# Patient Record
Sex: FEMALE | Race: WHITE | NOT HISPANIC OR LATINO | URBAN - METROPOLITAN AREA
[De-identification: names, ages, dates, MRNs, and addresses within clinical notes are randomized per-mention and may not be internally consistent; named-entity substitution may affect disease eponyms.]

---

## 2018-03-31 ENCOUNTER — EMERGENCY (EMERGENCY)
Facility: HOSPITAL | Age: 73
LOS: 1 days | Discharge: HOME | End: 2018-03-31
Attending: EMERGENCY MEDICINE | Admitting: EMERGENCY MEDICINE
Payer: COMMERCIAL

## 2018-03-31 VITALS
DIASTOLIC BLOOD PRESSURE: 74 MMHG | RESPIRATION RATE: 18 BRPM | HEART RATE: 79 BPM | TEMPERATURE: 98 F | OXYGEN SATURATION: 95 % | WEIGHT: 141.98 LBS | HEIGHT: 58 IN | SYSTOLIC BLOOD PRESSURE: 152 MMHG

## 2018-03-31 DIAGNOSIS — L03.116 CELLULITIS OF LEFT LOWER LIMB: ICD-10-CM

## 2018-03-31 DIAGNOSIS — M79.662 PAIN IN LEFT LOWER LEG: ICD-10-CM

## 2018-03-31 LAB
ANION GAP SERPL CALC-SCNC: 12 MMOL/L — SIGNIFICANT CHANGE UP (ref 5–17)
APTT BLD: 29.8 SEC — SIGNIFICANT CHANGE UP (ref 27.5–37.4)
BASOPHILS NFR BLD AUTO: 0.7 % — SIGNIFICANT CHANGE UP (ref 0–2)
BUN SERPL-MCNC: 19 MG/DL — SIGNIFICANT CHANGE UP (ref 7–23)
CALCIUM SERPL-MCNC: 9.2 MG/DL — SIGNIFICANT CHANGE UP (ref 8.4–10.5)
CHLORIDE SERPL-SCNC: 102 MMOL/L — SIGNIFICANT CHANGE UP (ref 96–108)
CO2 SERPL-SCNC: 28 MMOL/L — SIGNIFICANT CHANGE UP (ref 22–31)
CREAT SERPL-MCNC: 0.85 MG/DL — SIGNIFICANT CHANGE UP (ref 0.5–1.3)
EOSINOPHIL NFR BLD AUTO: 4 % — SIGNIFICANT CHANGE UP (ref 0–6)
GLUCOSE SERPL-MCNC: 111 MG/DL — HIGH (ref 70–99)
HCT VFR BLD CALC: 40.1 % — SIGNIFICANT CHANGE UP (ref 34.5–45)
HGB BLD-MCNC: 13.3 G/DL — SIGNIFICANT CHANGE UP (ref 11.5–15.5)
INR BLD: 1.08 — SIGNIFICANT CHANGE UP (ref 0.88–1.16)
LYMPHOCYTES # BLD AUTO: 21.4 % — SIGNIFICANT CHANGE UP (ref 13–44)
MCHC RBC-ENTMCNC: 27.4 PG — SIGNIFICANT CHANGE UP (ref 27–34)
MCHC RBC-ENTMCNC: 33.2 G/DL — SIGNIFICANT CHANGE UP (ref 32–36)
MCV RBC AUTO: 82.7 FL — SIGNIFICANT CHANGE UP (ref 80–100)
MONOCYTES NFR BLD AUTO: 14.2 % — HIGH (ref 2–14)
NEUTROPHILS NFR BLD AUTO: 59.7 % — SIGNIFICANT CHANGE UP (ref 43–77)
PLATELET # BLD AUTO: 356 K/UL — SIGNIFICANT CHANGE UP (ref 150–400)
POTASSIUM SERPL-MCNC: 4.3 MMOL/L — SIGNIFICANT CHANGE UP (ref 3.5–5.3)
POTASSIUM SERPL-SCNC: 4.3 MMOL/L — SIGNIFICANT CHANGE UP (ref 3.5–5.3)
PROTHROM AB SERPL-ACNC: 12 SEC — SIGNIFICANT CHANGE UP (ref 9.8–12.7)
RBC # BLD: 4.85 M/UL — SIGNIFICANT CHANGE UP (ref 3.8–5.2)
RBC # FLD: 14.1 % — SIGNIFICANT CHANGE UP (ref 10.3–16.9)
SODIUM SERPL-SCNC: 142 MMOL/L — SIGNIFICANT CHANGE UP (ref 135–145)
WBC # BLD: 4.3 K/UL — SIGNIFICANT CHANGE UP (ref 3.8–10.5)
WBC # FLD AUTO: 4.3 K/UL — SIGNIFICANT CHANGE UP (ref 3.8–10.5)

## 2018-03-31 PROCEDURE — 85730 THROMBOPLASTIN TIME PARTIAL: CPT

## 2018-03-31 PROCEDURE — 93970 EXTREMITY STUDY: CPT

## 2018-03-31 PROCEDURE — 36415 COLL VENOUS BLD VENIPUNCTURE: CPT

## 2018-03-31 PROCEDURE — 93970 EXTREMITY STUDY: CPT | Mod: 26

## 2018-03-31 PROCEDURE — 80048 BASIC METABOLIC PNL TOTAL CA: CPT

## 2018-03-31 PROCEDURE — 85610 PROTHROMBIN TIME: CPT

## 2018-03-31 PROCEDURE — 99284 EMERGENCY DEPT VISIT MOD MDM: CPT | Mod: 25

## 2018-03-31 PROCEDURE — 99284 EMERGENCY DEPT VISIT MOD MDM: CPT

## 2018-03-31 PROCEDURE — 85025 COMPLETE CBC W/AUTO DIFF WBC: CPT

## 2018-03-31 RX ORDER — AZTREONAM 2 G
1 VIAL (EA) INJECTION
Qty: 14 | Refills: 0 | OUTPATIENT
Start: 2018-03-31 | End: 2018-04-06

## 2018-03-31 RX ADMIN — Medication 1 TABLET(S): at 19:55

## 2018-03-31 NOTE — ED ADULT TRIAGE NOTE - CHIEF COMPLAINT QUOTE
L leg pain and swelling x2 days, no trauma, on ASA 81mg every day, recent flight from [place] on Sunday L leg pain and swelling x2 days, no trauma, on ASA 81mg every day, recent flight from [place] on Sunday, no CP/SOB, no cardiac Hx

## 2018-03-31 NOTE — ED PROVIDER NOTE - MUSCULOSKELETAL, MLM
Spine appears normal, range of motion is not limited, left calf with mild redness/warmth/ tenderness lateral / anterior.  1+ edema.  right ankle mild swelling.  pedal pulse 2+

## 2018-03-31 NOTE — ED PROVIDER NOTE - OBJECTIVE STATEMENT
here with 2 d of left calf pain/ mild swelling/ redness.  Denies trauma, fever, weakness, chest pain or trouble breathing.  Also notes mild swelling around right ankle.  Took motrin which helped with pain.

## 2018-03-31 NOTE — ED ADULT NURSE NOTE - CHIEF COMPLAINT QUOTE
L leg pain and swelling x2 days, no trauma, on ASA 81mg every day, recent flight from [place] on Sunday, no CP/SOB, no cardiac Hx

## 2018-03-31 NOTE — ED PROVIDER NOTE - MEDICAL DECISION MAKING DETAILS
calf pain/ swelling/ redness.  duplex neg for dvt.  suspect cellulitis.  plan bactrim.  discussed repeat duplex in 1 week if symptoms persist

## 2018-03-31 NOTE — ED ADULT NURSE NOTE - OBJECTIVE STATEMENT
pt received in [facility] a & o x 3 pt c/o left calf swelling and pain since last night , extremity is warm to touch , pt had a recent flight from [place] on Sunday , pt was seen this morning at [facility] and was informed to visit ER to rule out a DVT will continue to monitor

## 2022-12-08 ENCOUNTER — APPOINTMENT (OUTPATIENT)
Dept: PULMONOLOGY | Facility: CLINIC | Age: 77
End: 2022-12-08

## 2022-12-08 ENCOUNTER — LABORATORY RESULT (OUTPATIENT)
Age: 77
End: 2022-12-08

## 2022-12-08 VITALS
HEIGHT: 57.87 IN | BODY MASS INDEX: 29.81 KG/M2 | WEIGHT: 142 LBS | DIASTOLIC BLOOD PRESSURE: 82 MMHG | HEART RATE: 79 BPM | SYSTOLIC BLOOD PRESSURE: 139 MMHG | TEMPERATURE: 97.8 F | OXYGEN SATURATION: 95 %

## 2022-12-08 DIAGNOSIS — L95.9 VASCULITIS LIMITED TO THE SKIN, UNSPECIFIED: ICD-10-CM

## 2022-12-08 DIAGNOSIS — Z86.018 PERSONAL HISTORY OF OTHER BENIGN NEOPLASM: ICD-10-CM

## 2022-12-08 DIAGNOSIS — Z86.79 PERSONAL HISTORY OF OTHER DISEASES OF THE CIRCULATORY SYSTEM: ICD-10-CM

## 2022-12-08 PROCEDURE — 99244 OFF/OP CNSLTJ NEW/EST MOD 40: CPT

## 2022-12-08 RX ORDER — CANDESARTAN CILEXETIL 8 MG/1
8 TABLET ORAL
Refills: 0 | Status: ACTIVE | COMMUNITY

## 2022-12-08 RX ORDER — CHROMIUM 200 MCG
TABLET ORAL
Refills: 0 | Status: ACTIVE | COMMUNITY

## 2022-12-08 RX ORDER — APIXABAN 2.5 MG/1
2.5 TABLET, FILM COATED ORAL
Refills: 0 | Status: ACTIVE | COMMUNITY

## 2022-12-08 RX ORDER — ROSUVASTATIN CALCIUM 10 MG/1
10 TABLET, FILM COATED ORAL
Refills: 0 | Status: ACTIVE | COMMUNITY

## 2022-12-08 NOTE — ASSESSMENT
[FreeTextEntry1] : 12-8-22:\par \par It was a pleasure to meet Yancy today in consultation. Her respiratory issues are summarized:\par \par 1. Lymphangioleiomyomatosis (MALONE)\par This patient has a diagnosis of MALONE made at [facility] in 1996. She remains functional and has not noticed any functional limitation. There is no history of pneumothoraces or pleural effusions. 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 She has been taking flights and has not had any problem in flight. As per the history provided, she has not been put on sirolimus. \par \par Most of the people who show progression with MALONE show deterioration in their PFTs and complications at a young stage. Yancy is in her 70s: it is likely that her prognosis will remain good and she will not have deterioration in lung function. \par \par Our plan of action is preventative.\par a. We have discussed the importance of COVID and influenza vaccines. However she does develop a very severe reaction and she showed us images of the skin rash that she has developed with influenza vaccine necessitating oral steroids. \par b. She is a lifelong non smoker\par c. Ensuring that she does not require oxygen either at night or during walking. If she does desaturate on 6MWT (scheduled), we will also get a nocturnal oxygen test\par d. Obstructive sleep apnea. She does have a crowded oropharynx although she denies symptoms of obstructive sleep apnea\par e. Continuous exercise. After getting PFTs, we will advise her to exercise on a daily basis\par f. dietary restrictions. In patients who have MALONE, it has been recommended that they avoid specific foods such as those containing soy.\par g. Obstructive lung disease. We will get PFTs to see if the patient has obstructive lung disease. If she does, we will give her inhaled corticosteroids +/- LABA\par h. The patient is aware of the risks of taking air travel. However, she has the benefit of time and has not developed pneumothorax since the diagnosis of MALONE was made.\par i. We will also check what her lung reserve is and increase in of cysts in her lungs. We have ordered a low dose chest CT\par j. The patient is aware that taking estrogens can worsen the disease and she is not on any hormone replacement therapy.\par \par Plan:\par - As mentioned above we will assess PFT, 6MWT and a low dose chest CT to see if there has been progression of disease since 2019. If that is deemed to be the case, we will consider very low doses of Sirolimus since she is post-menopausal aiming for a trough level at 20-28 hrs of post sirolimus injection < 10ng/ml. Most of the time in post-menopausal women, if sirolimus is required, the dose can be significantly reduced to 1mg/day.\par - If Sirolimus is not given, it would be imperative to follow up her exercise capacity and her breathing tests. An FEV1 of < 70% of predicted, reduction of FEV1 > 100 ml per year, increase in residual volume by greater than 120%, drop in SpO2 > 4% on brisk walking, or a PaO2 < 70mmHg would be reasons to consider given sirolimus. \par - We will order an echocardiogram to evaluate pulmonary artery pressures\par - After the testing is done, we will consider vascular endothelial growth factor (VEG-D) monitoring. We would obtain one level and establish a baseline for the patient. \par - We will be monitoring for angiolipomas in the kidneys or other organs\par - We will present her case to [facility] to [facility]\par \par 2. Hypercoagulable state\par She has a history of superficial thrombosis of lesser saphenous vein. She has a prothrombin G mutation. She is on Eliquis 2.5 BID. Her d-dimer has been persistently high. We will re-check this today.\par \par 3. History of COVID-19 infection\par She tested positive for COVID-19 in October. She was sick for 3 weeks. She had a cough and noticed a drop in her oxygen saturation. She is back to her baseline now and does not complain of shortness of breath.\par \par 4. 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 She has a rash and history of lipomas. There is ecchymosis seen on her inner thigh. We will check vasculitis and collagen vascular screening (RF, WILLIAN, CH50) labs today.\par \par 5. Rhinitis\par She complains of yellow phlegm and change in quality of her voice. She has a deviated nasal septum, erythema and narrowing on the right side, no significant discharge. We recommend Igor med nasal sinus rinse followed by Flonase twice a day. We do not recommend she take antibiotics.\par \par 6. 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 She denies symptoms of RICCO. She does not believe that she snores. We will hold off doing a sleep study at this time.\par \par Return to clinic: 2 months (telehealth)

## 2022-12-08 NOTE — CONSULT LETTER
[Dear  ___] : Dear  [unfilled], [Consult Letter:] : I had the pleasure of evaluating your patient, [unfilled]. [FreeTextEntry2] : Dr. Palomo Lewis \par [address] [facility], [address]\[place] [address] \par ph [phone]\par fax [phone]\par

## 2022-12-08 NOTE — PHYSICAL EXAM
[No Acute Distress] : no acute distress [Normal Appearance] : normal appearance [No Neck Mass] : no neck mass [Normal Rate/Rhythm] : normal rate/rhythm [Normal S1, S2] : normal s1, s2 [No Murmurs] : no murmurs [No Resp Distress] : no resp distress [No Abnormalities] : no abnormalities [Benign] : benign [Normal Gait] : normal gait [No Clubbing] : no clubbing [No Cyanosis] : no cyanosis [No Edema] : no edema [FROM] : FROM [Normal Color/ Pigmentation] : normal color/ pigmentation [No Focal Deficits] : no focal deficits [Oriented x3] : oriented x3 [Normal Affect] : normal affect [TextBox_11] : deviated nasal septum, erythema on the right side, narrowing on the right side, no significant discharge, mallampati IV, crowded oropharynx [TextBox_68] : very fine early inspiratory dry crackles appreciated at the bases, 1/4 way up slightly more up the left base as compared to the right

## 2022-12-08 NOTE — PROCEDURE
[FreeTextEntry1] : 9/6/2022 D-dimer 3.95\par Negative ds-DNA ab, anticardiolipin IgM, IgG, RNP ab, smith ab, SS-A, Scl-70, Jessica-1\par Beta 2 glycoprotein IgM (9.6)\par Normal beta 2 glycoprotein IgG\par Positive c-ANCA\par \par Labs 9/2021\par WILLIAN positive\par Beta-2 Glycoprotein IgA ab\par Elevated RNP ab (1.5)\par D-dimer 5.67\par \par Lower extremity doppler 6/2022\par Impression: Within normal limits bilateral deep and right superficial venous doppler exam findings. At left v. saphena parva from crusis middle plan until distal observed, segment wall has regular thickening has been interpreted as post-thrombotic sequela findings. When compared to exam in 2020, no meaningful changes have been observed.\par \par CT 1/10/2019\par Impression: Disseminated air cysts in pulmonary parenchyma with thin walls, virtually all with subcentimeter size; possible Lymphangioleiomyomatosis; no evidence of secondary supportive finding. Histiosis-x and paraseptal emphysema are considered in differential diagnosis\par Peripheral atelectasis in right middle lobe and pleuroparenchymal fibrosis\par mediastinal calcific lymph nodes, a 10 mm nodule in the right breast\par Cardiovascular calcific atherosclerotic vessel disease\par \par Chest CT Feb 2016\par Impression: Innumerable thin walled cysts diffusely throughout both lung fields with a slight apical predominance on a background of ground glass attenuation. no evidence of nodule or dominant mass. These findings are most consistent with a clinical history of MALONE

## 2022-12-08 NOTE — HISTORY OF PRESENT ILLNESS
[TextBox_4] : 77 year old female history of MALONE, superficial venous thrombosis (on eliquis), Prothrombin G therapy, skin vasculitis (diagnosed by biopsy)\par Born in [facility]\par [age] she was given hormone replacement therapy pre-menopausal\par In 1996, she developed a cough\par She was following in [place] because her son was at [place]\par In 1996 had surgery, flexible bronchoscopy and biopsy on both sides consistent with MALONE\par Hormone therapy was stopped\par In 2016, she had a chest CT at [facility], Dr. Marques\par She was stable until 2012, monitored by chest x-rays and bloodwork, PFTs\par She had COVID in October that lasted for 3 weeks. She did not take Paxlovid. She had a drop in her oxygen and cough. She had yellow phlegm\par She has not had a pneumothorax\par She has had an abnormal d-dimer elevation in the past\par In September 2019, she had a thrombosis at the lesser saphenous vein beginning from the middle of the calf accompanied by lymphdenma in the distal cruris, urgent hospitalized treated with blood thinners was continued with Xarelto. She had hematomas and bleeding. Xarelto was stopped and she was switched to Eliquis 2.5mg BID, which she continues on\par She got the flu vaccine, but due to reaction she had to take a medrol arnoldo\par She denies shortness of breath\par \par Rheumatologist: Dr. Palomo Bustos. He recommends Plaquenil but she is reluctant to try

## 2022-12-08 NOTE — ADDENDUM
[FreeTextEntry1] : I, Dr. Portillo Velasquez, personally performed the evaluation and management services for this new patient.  This evaluation and management includes conducting the initial interview of the patient, performing the initial examination, assessing all medical conditions, reviewing all medical records available and establishing the plan of comprehensive care.  Today, my ACP, Ms. Esme Lozoya, participated in the process of patient evaluation and management.  She and I have discussed the management of the patient, and she understands the plan moving forwards

## 2022-12-08 NOTE — DISCUSSION/SUMMARY
[FreeTextEntry1] : ATTENDING'S SUMMARY:\par 12-8-22:\par no pallor, no icterus\par deviated nasal septum, erythema on the right side, narrowing on the right side, no significant discharge\par no JVD, no hepatojugular reflux, no HSM\par no cervical adenopathy, no supraclavicular adenopathy\par normal s1/s2, no murmurs, rubs or gallops\par very fine early inspiratory dry crackles appreciated at the bases, 1/4 way up slightly more up the left base as compared to the right\par no cyanosis, no clubbing, no articular manifestations, no thickening of the skin, raynauds, no ulceration, no rash noted on the upper extremities\par no pedal edema

## 2022-12-09 ENCOUNTER — NON-APPOINTMENT (OUTPATIENT)
Age: 77
End: 2022-12-09

## 2022-12-09 LAB
ALBUMIN SERPL ELPH-MCNC: 4.4 G/DL
ALP BLD-CCNC: 77 U/L
ALT SERPL-CCNC: 19 U/L
ANION GAP SERPL CALC-SCNC: 12 MMOL/L
AST SERPL-CCNC: 26 U/L
BASOPHILS # BLD AUTO: 0.04 K/UL
BASOPHILS NFR BLD AUTO: 0.8 %
BILIRUB SERPL-MCNC: 0.3 MG/DL
BUN SERPL-MCNC: 17 MG/DL
C3 SERPL-MCNC: 135 MG/DL
C4 SERPL-MCNC: 33 MG/DL
CALCIUM SERPL-MCNC: 9.3 MG/DL
CH50 SERPL-MCNC: 90 U/ML
CHLORIDE SERPL-SCNC: 106 MMOL/L
CO2 SERPL-SCNC: 23 MMOL/L
COVID-19 NUCLEOCAPSID  GAM ANTIBODY INTERPRETATION: POSITIVE
COVID-19 SPIKE DOMAIN ANTIBODY INTERPRETATION: POSITIVE
CREAT SERPL-MCNC: 0.74 MG/DL
CRP SERPL-MCNC: 10 MG/L
DEPRECATED D DIMER PPP IA-ACNC: 4440 NG/ML DDU
EGFR: 84 ML/MIN/1.73M2
ENA RNP AB SER IA-ACNC: 1.2 AL
ENA SM AB SER IA-ACNC: <0.2 AL
EOSINOPHIL # BLD AUTO: 0.12 K/UL
EOSINOPHIL NFR BLD AUTO: 2.5 %
ERYTHROCYTE [SEDIMENTATION RATE] IN BLOOD BY WESTERGREN METHOD: 7 MM/HR
GLUCOSE SERPL-MCNC: 99 MG/DL
HCT VFR BLD CALC: 40.9 %
HGB BLD-MCNC: 12.7 G/DL
IMM GRANULOCYTES NFR BLD AUTO: 0.2 %
LYMPHOCYTES # BLD AUTO: 1.31 K/UL
LYMPHOCYTES NFR BLD AUTO: 27.6 %
MAN DIFF?: NORMAL
MCHC RBC-ENTMCNC: 27.7 PG
MCHC RBC-ENTMCNC: 31.1 GM/DL
MCV RBC AUTO: 89.3 FL
MONOCYTES # BLD AUTO: 0.45 K/UL
MONOCYTES NFR BLD AUTO: 9.5 %
NEUTROPHILS # BLD AUTO: 2.81 K/UL
NEUTROPHILS NFR BLD AUTO: 59.4 %
NT-PROBNP SERPL-MCNC: 240 PG/ML
PLATELET # BLD AUTO: 279 K/UL
POTASSIUM SERPL-SCNC: 4.1 MMOL/L
PROT SERPL-MCNC: 6.8 G/DL
RBC # BLD: 4.58 M/UL
RBC # FLD: 14.3 %
RHEUMATOID FACT SER QL: 12 IU/ML
SARS-COV-2 AB SERPL IA-ACNC: >250 U/ML
SARS-COV-2 AB SERPL QL IA: 57.3 INDEX
SODIUM SERPL-SCNC: 141 MMOL/L
WBC # FLD AUTO: 4.74 K/UL

## 2022-12-11 ENCOUNTER — NON-APPOINTMENT (OUTPATIENT)
Age: 77
End: 2022-12-11

## 2022-12-12 ENCOUNTER — NON-APPOINTMENT (OUTPATIENT)
Age: 77
End: 2022-12-12

## 2022-12-12 LAB
CRYOGLOB SERPL-MCNC: NEGATIVE
ENA JO1 AB SER IA-ACNC: <0.2 AL

## 2022-12-13 ENCOUNTER — NON-APPOINTMENT (OUTPATIENT)
Age: 77
End: 2022-12-13

## 2022-12-13 LAB
ANA SER IF-ACNC: NEGATIVE
MYELOPEROXIDASE AB SER QL IA: <5 UNITS
MYELOPEROXIDASE CELLS FLD QL: NEGATIVE
PROTEINASE3 AB SER IA-ACNC: <5 UNITS
PROTEINASE3 AB SER-ACNC: NEGATIVE

## 2022-12-15 ENCOUNTER — TRANSCRIPTION ENCOUNTER (OUTPATIENT)
Age: 77
End: 2022-12-15

## 2022-12-19 ENCOUNTER — APPOINTMENT (OUTPATIENT)
Dept: PULMONOLOGY | Facility: CLINIC | Age: 77
End: 2022-12-19
Payer: SELF-PAY

## 2022-12-28 ENCOUNTER — NON-APPOINTMENT (OUTPATIENT)
Age: 77
End: 2022-12-28

## 2022-12-28 LAB
EJ AB SER QL: NEGATIVE
ENA JO1 AB SER IA-ACNC: <20 UNITS
ENA PM/SCL AB SER-ACNC: <20 UNITS
ENA SM+RNP AB SER IA-ACNC: <20 UNITS
ENA SS-A IGG SER QL: <20 UNITS
FIBRILLARIN AB SER QL: NEGATIVE
KU AB SER QL: NEGATIVE
MDA-5 (P140)(CADM-140): <20 UNITS
MI2 AB SER QL: NEGATIVE
NXP-2 (P140): <20 UNITS
OJ AB SER QL: NEGATIVE
PL12 AB SER QL: NEGATIVE
PL7 AB SER QL: NEGATIVE
SRP AB SERPL QL: NEGATIVE
TIF GAMMA (P155/140): <20 UNITS
U2 SNRNP AB SER QL: NEGATIVE

## 2023-02-02 ENCOUNTER — APPOINTMENT (OUTPATIENT)
Dept: PULMONOLOGY | Facility: CLINIC | Age: 78
End: 2023-02-02
Payer: SELF-PAY

## 2023-02-02 ENCOUNTER — NON-APPOINTMENT (OUTPATIENT)
Age: 78
End: 2023-02-02

## 2023-02-02 PROCEDURE — ZZZZZ: CPT

## 2023-02-02 PROCEDURE — 94729 DIFFUSING CAPACITY: CPT

## 2023-02-02 PROCEDURE — 94727 GAS DIL/WSHOT DETER LNG VOL: CPT

## 2023-02-02 PROCEDURE — 94010 BREATHING CAPACITY TEST: CPT

## 2023-02-02 PROCEDURE — 94618 PULMONARY STRESS TESTING: CPT

## 2023-02-07 RX ORDER — HYDROXYCHLOROQUINE SULFATE 200 MG/1
200 TABLET, FILM COATED ORAL DAILY
Refills: 0 | Status: ACTIVE | COMMUNITY
Start: 2023-02-07

## 2023-02-17 ENCOUNTER — APPOINTMENT (OUTPATIENT)
Dept: CT IMAGING | Facility: HOSPITAL | Age: 78
End: 2023-02-17
Payer: COMMERCIAL

## 2023-02-17 ENCOUNTER — OUTPATIENT (OUTPATIENT)
Dept: OUTPATIENT SERVICES | Facility: HOSPITAL | Age: 78
LOS: 1 days | End: 2023-02-17
Payer: SELF-PAY

## 2023-02-17 PROCEDURE — 71250 CT THORAX DX C-: CPT

## 2023-02-17 PROCEDURE — 71250 CT THORAX DX C-: CPT | Mod: 26

## 2023-02-21 ENCOUNTER — APPOINTMENT (OUTPATIENT)
Dept: PULMONOLOGY | Facility: CLINIC | Age: 78
End: 2023-02-21
Payer: COMMERCIAL

## 2023-02-21 PROCEDURE — ZZZZZ: CPT

## 2023-02-23 NOTE — ASSESSMENT
[FreeTextEntry1] : 2-21-23:\par It was a pleasure to meet Yancy today in consultation. Her respiratory issues are summarized:\par \par 1. Lymphangioleiomyomatosis (MALONE)\par This patient has a diagnosis of MALONE made at [facility] in 1996. She remains functional and has not noticed any functional limitation. There is no history of pneumothoraces or pleural effusions. 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 She has been taking flights and has not had any problem in flight. As per the history provided, she has not been put on sirolimus. \par \par Most of the people who show progression with MALONE show deterioration in their PFTs and complications at a young stage. Yancy is in her 70s: it is likely that her prognosis will remain good and she will not have deterioration in lung function. \par \par Our plan of action is preventative.\par a. We have discussed the importance of COVID and influenza vaccines. However she does develop a very severe reaction and she showed us images of the skin rash that she has developed with influenza vaccine necessitating oral steroids. \par b. She is a lifelong non smoker\par c. Ensuring that she does not require oxygen either at night or during walking. If she does desaturate on 6MWT (scheduled), we will also get a nocturnal oxygen test\par d. Obstructive sleep apnea. She does have a crowded oropharynx although she denies symptoms of obstructive sleep apnea\par e. Continuous exercise. We have advised her to exercise on a daily basis\par f. dietary restrictions. In patients who have MALONE, it has been recommended that they avoid specific foods such as those containing soy.\par g. Obstructive lung disease. There is no evidence of obstruction on PFT. FEV1/FVC: 75%. If she does, we will give her inhaled corticosteroids +/- LABA\par h. The patient is aware of the risks of taking air travel. However, she has the benefit of time and has not developed pneumothorax since the diagnosis of MALONE was made.\par i. CT chest 2/17/23 shows no significant change in diffuse cystic lesions, no evidence of effusions to suggest interval development of chylothorax, no sign of pneumothorax.\par j. The patient is aware that taking estrogens can worsen the disease and she is not on any hormone replacement therapy.\par \par There has been a decrease in diffusion capacity since 2010. Her D-Dimer is > 4440. She states this is not new and it has been higher.\par \par CT chest 2/17/23 shows no pneumothorax, no pleural effusion. She has continued linear scarring with suture line present from prior surgical lung biopsy. She continues to have diffuse cystic changes throughout all lung fields, worse in the upper lobes bilaterally without significant change from prior scan in 2019.  \par \par PFTs show a progressive decline in DLCO. Her prior DLCO in 2010 was 74% predicted, in 2018 was 60% predicted and is now 42% predicted showing a significant recent decline. The echocardiogram done 2/16/23 showed no sign of pulmonary HTN or RV dysfunction to explain the decline in DLCO. The FEV1 has also shown a significant decline from 168% in 2018 to 115%. \par \par We would like to discuss her case with cystic lung disease specialist Dr. Pablo Ba for consultation regarding the initiation of Sirolimus. She does not meet the criteria for starting Sirolimus based on the absolute values of her PFTs or 6MWT (FEV1 of < 70% of predicted, reduction of FEV1 > 100 ml per year, increase in residual volume by greater than 120%, drop in SpO2 > 4% on brisk walking, or a PaO2 < 70mmHg), but she would likely clinically benefit from starting Sirolimus given her recent rapid decline in FEV1 and DLCO to try to maintaining her current functional status. \par \par Plan:\par - We would like her to see the MALONE specialist Dr. Pablo Ba for consultation for initiation of Sirolimus and she will then continue to follow up with us\par - Pending discussion with Dr. Shah, we would like to start low doses of Sirolimus since she is post-menopausal aiming for a trough level at 20-28 hrs of post sirolimus injection < 10ng/ml. Most of the time in post-menopausal women, if sirolimus is required, the dose can be significantly reduced to 1mg/day.\par - If Sirolimus is not given, it would be imperative to follow up her exercise capacity and her breathing tests to continue to monitor for need/indication for the initiation of Sirolimus \par - We will have her been seen for evaluation for pulmonary rehab\par - We will be monitoring for angiolipomas in the kidneys or other organs\par \par \par 2. Hypercoagulable state\par She has a history of superficial thrombosis of lesser saphenous vein. She has a prothrombin G mutation. She is on Eliquis 2.5 BID. Her d-dimer has been persistently high. Her D-Dimer was > 4440 last visit.\par \par 3. History of COVID-19 infection\par She tested positive for COVID-19 in October. She was sick for 3 weeks. She had a cough and noticed a drop in her oxygen saturation. She is back to her baseline now and does not complain of shortness of breath.\par \par 4. 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 She has a rash and history of lipomas. There is ecchymosis seen on her inner thigh. She has been started on Plaquenil by her rheumatologist. \par \par 5. Rhinitis\par She complains of yellow phlegm and change in quality of her voice. She has a deviated nasal septum, erythema and narrowing on the right side, no significant discharge. We recommend Igor med nasal sinus rinse followed by Flonase twice a day. We do not recommend she take antibiotics.\par \par 6. 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 She denies symptoms of RICCO. She does not believe that she snores. We will hold off doing a sleep study at this time.\par \par Return to clinic: in 6 weeks

## 2023-02-23 NOTE — CONSULT LETTER
[Dear  ___] : Dear  [unfilled], [Consult Letter:] : I had the pleasure of evaluating your patient, [unfilled]. [FreeTextEntry2] : Dr. Palomo Lewis \par [address] [facility], [address]\[place] [address] \par ph [phone]\par fax [phone]\par

## 2023-02-23 NOTE — HISTORY OF PRESENT ILLNESS
[TextBox_4] : 77 year old female history of MALONE, superficial venous thrombosis (on eliquis), Prothrombin G therapy, skin vasculitis (diagnosed by biopsy)\par Born in [facility]\par 49-49 she was given hormone replacement therapy pre-menopausal\par In 1996, she developed a cough\par She was following in [place] because her son was at [place]\par In 1996 had surgery, flexible bronchoscopy and biopsy on both sides consistent with MALONE\par Hormone therapy was stopped\par In 2016, she had a chest CT at [facility], Dr. Marques\par She was stable until 2012, monitored by chest x-rays and bloodwork, PFTs\par She had COVID in October that lasted for 3 weeks. She did not take Paxlovid. She had a drop in her oxygen and cough. She had yellow phlegm\par She has not had a pneumothorax\par She has had an abnormal d-dimer elevation in the past\par In September 2019, she had a thrombosis at the lesser saphenous vein beginning from the middle of the calf accompanied by lymphdenma in the distal cruris, urgent hospitalized treated with blood thinners was continued with Xarelto. She had hematomas and bleeding. Xarelto was stopped and she was switched to Eliquis 2.5mg BID, which she continues on\par She got the flu vaccine, but due to reaction she had to take a medrol arnoldo\par She denies shortness of breath\par \par Rheumatologist: Dr. Palomo Bustos. He recommends Plaquenil but she is reluctant to try\par \par 2-21-23:\par She reports doing well overall.\par She has started to work out twice a week with a . She is doing stretching and she is walking on the treadmill. Even after lifting weights and exercising, her O2 sat has been good. \par She is able to do her ADLs at home. She can climb stairs slowly. \par She has also been started on plaquenil by her rheumatologist. \par

## 2023-02-23 NOTE — PROCEDURE
[FreeTextEntry1] : Echocardiogram 2023\par Normal LV size, systolic and diastolic function\par LA is normal in size\par RV is normal in size and function\par RA is normal in size\par PASP is estimated to be 18 mmHg \par \par Chest CT 23\par IMPRESSION:\par 1. Findings compatible with MALONE. There has been minimal interval progression in extent of disease predominately in the upper lung zones with a slight interval increase in the size of multiple thin-walled cysts.\par 2. Persistent small right-sided pleural effusion which has increased minimally in extent.\par 3. Stable 10 mm right breast soft tissue nodule. Stable punctate left breast microcalcification. Clinical and mammographic correlation.\par \par Diffusion capacity (DLCO):\par : 13.58\par : 12.01\par 2016: 10.5\par 2018: 9.61\par 2018: 10.34\par : 7.18\par \par PFT 23\par FVC: 2.52 L (115%)\par FEV1: 1.90 L (117%)\par FEV1/FVC: 75%\par FEF 25-75%: 1.50 L/s (113%)\par T.08 L (99%)\par RV/T%\par DLCO: 7.18 (42%)\par \par 6MWT 23: 316 meters, SpO2 93% -> 93%. \par \par Labs \par normal RNP, pro-BNP, CH50, CMP, Smith ab\par D-dimer > 4000\par RNP ab elevated (1.2)\par CRP (10)\par Positive covid IgG ab, spike ab\par \par 2022 D-dimer 3.95\par Negative ds-DNA ab, anticardiolipin IgM, IgG, RNP ab, smith ab, SS-A, Scl-70, Jessica-1\par Beta 2 glycoprotein IgM (9.6)\par Normal beta 2 glycoprotein IgG\par Positive c-ANCA\par \par Labs 2021\par WILLIAN positive\par Beta-2 Glycoprotein IgA ab\par Elevated RNP ab (1.5)\par D-dimer 5.67\par \par Lower extremity doppler 2022\par Impression: Within normal limits bilateral deep and right superficial venous doppler exam findings. At left v. saphena parva from crusis middle plan until distal observed, segment wall has regular thickening has been interpreted as post-thrombotic sequela findings. When compared to exam in , no meaningful changes have been observed.\par \par CT 1/10/2019\par Impression: Disseminated air cysts in pulmonary parenchyma with thin walls, virtually all with subcentimeter size; possible Lymphangioleiomyomatosis; no evidence of secondary supportive finding. Histiosis-x and paraseptal emphysema are considered in differential diagnosis\par Peripheral atelectasis in right middle lobe and pleuroparenchymal fibrosis\par mediastinal calcific lymph nodes, a 10 mm nodule in the right breast\par Cardiovascular calcific atherosclerotic vessel disease\par \par Chest CT 2016\par Impression: Innumerable thin walled cysts diffusely throughout both lung fields with a slight apical predominance on a background of ground glass attenuation. no evidence of nodule or dominant mass. These findings are most consistent with a clinical history of MALONE

## 2023-02-23 NOTE — DISCUSSION/SUMMARY
[FreeTextEntry1] : ATTENDING'S SUMMARY:\par 2-21-23:\par no pallor, no icterus\par deviated nasal septum, erythema on the right side, narrowing on the right side, no significant discharge\par no JVD, no hepatojugular reflux, no HSM\par no cervical adenopathy, no supraclavicular adenopathy\par normal s1/s2, no murmurs, rubs or gallops\par very fine early inspiratory dry crackles appreciated at the bases, 1/4 way up slightly more up the left base as compared to the right\par no cyanosis, no clubbing, no articular manifestations, no thickening of the skin, raynauds, no ulceration, no rash noted on the upper extremities\par no pedal edema

## 2023-02-23 NOTE — REASON FOR VISIT
[Follow-Up] : a follow-up visit [TextBox_44] : This visit was provided via telehealth using real-time 2-way audio visual technology. The patient, Yancy Garcia, was located at home, [address] [address] , at the time of the visit. \par The provider, CHRIS SMITH, was located at the medical office located at the time of the visit. The patient, YANCY GARCIA and Provider participated in the telehealth encounter. \par Verbal consent given on February 21, 2023 by the patient. \par This visit was provided via telehealth using real-time 2-way audio visual technology. The patient, Yancy Garcia, was located [place] home, [address] [id number], at the time of the visit. The provider, CHRIS SMITH, was located at the medical office located at the time of the visit. The patient, YANCY AGRCIA and Provider participated in the telehealth encounter. Verbal consent given on February 21, 2023 by the patient.

## 2023-05-25 ENCOUNTER — FORM ENCOUNTER (OUTPATIENT)
Age: 78
End: 2023-05-25

## 2023-05-26 ENCOUNTER — OUTPATIENT (OUTPATIENT)
Dept: OUTPATIENT SERVICES | Facility: HOSPITAL | Age: 78
LOS: 1 days | End: 2023-05-26
Payer: COMMERCIAL

## 2023-05-26 DIAGNOSIS — Z86.16 PERSONAL HISTORY OF COVID-19: ICD-10-CM

## 2023-05-26 DIAGNOSIS — J84.81 LYMPHANGIOLEIOMYOMATOSIS: ICD-10-CM

## 2023-05-26 PROCEDURE — 93306 TTE W/DOPPLER COMPLETE: CPT

## 2023-05-26 PROCEDURE — 93306 TTE W/DOPPLER COMPLETE: CPT | Mod: 26

## 2023-05-30 ENCOUNTER — NON-APPOINTMENT (OUTPATIENT)
Age: 78
End: 2023-05-30

## 2023-06-06 ENCOUNTER — APPOINTMENT (OUTPATIENT)
Dept: HEART AND VASCULAR | Facility: CLINIC | Age: 78
End: 2023-06-06
Payer: COMMERCIAL

## 2023-06-06 VITALS — SYSTOLIC BLOOD PRESSURE: 132 MMHG | DIASTOLIC BLOOD PRESSURE: 79 MMHG

## 2023-06-06 VITALS
TEMPERATURE: 97.6 F | WEIGHT: 140 LBS | HEART RATE: 60 BPM | OXYGEN SATURATION: 97 % | DIASTOLIC BLOOD PRESSURE: 78 MMHG | HEIGHT: 58.27 IN | BODY MASS INDEX: 28.99 KG/M2 | SYSTOLIC BLOOD PRESSURE: 149 MMHG

## 2023-06-06 DIAGNOSIS — Z00.00 ENCOUNTER FOR GENERAL ADULT MEDICAL EXAMINATION W/OUT ABNORMAL FINDINGS: ICD-10-CM

## 2023-06-06 PROCEDURE — 93000 ELECTROCARDIOGRAM COMPLETE: CPT

## 2023-06-06 PROCEDURE — 99205 OFFICE O/P NEW HI 60 MIN: CPT | Mod: 25

## 2023-06-06 RX ORDER — METOPROLOL TARTRATE 25 MG/1
25 TABLET, FILM COATED ORAL AT BEDTIME
Refills: 0 | Status: ACTIVE | COMMUNITY

## 2023-06-06 NOTE — REASON FOR VISIT
[Other: ____] : [unfilled] [FreeTextEntry1] : Diagnostic Tests:\par -------------------------------------\par ECG:\par 06/06/23: sinus rhythm, normal ECG. \par -------------------------------------\par Echo:\par 05/6/23: EF 57%, mild LVH, grade I diastolic dysfunction, aortic sclerosis, moderate PI. \par -------------------------------------\par PFTs:\par 02/02/23: normal FEV1/FVC, normal lung capacity, , moderately decreased DLCO. \par -------------------------------------\par CT:\par 02/17/23: chest: findings compatible with MALONE, minimal progression, persistent \par -------------------------------------\par Stress tests:\par 03/044/2020: ETT: normal post 7 METS.

## 2023-06-06 NOTE — HISTORY OF PRESENT ILLNESS
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  She was diagnosed with MALONE in 1996 but her condition is currently stable.  She has KUMARI to 2 flights of stairs and denies lower extremity edema or chest pain.   On 05/06/23, she had an echocardiogram which revealed an EF of 57%, mild LVH, grade I diastolic dysfunction, aortic sclerosis, and moderate PI.

## 2023-06-06 NOTE — ASSESSMENT
[FreeTextEntry1] : 1. Lymphangioleiomyomatosis (MALONE): \par       - follow up with pulmonologist, Portillo Velasquez MD\par       - follow up with MALONE specialist, Pablo Ba MD ([facility])\par \par 2. Hypercoagulable state: prothrombin gene mutation [id number], s/p left SSV thrombosis (2019):\par      - continue long-term preventive therapy with Eliquis 2.5mg po bid \par      - will pursue treatment dose of 5mg po bid around high-risk times (such as flights) \par \par 3. Dyslipidemia:\par     - continue rosuvastatin 10mg po qd \par     - discussed therapeutic lifestyle changes to promote improved lipid metabolism \par     - check lab work today \par \par 4. HTN: BP near ACC/AHA 2017 guideline target:\par     - continue candesartan 8mg po qd\par \par 5. Palpitations: ? secondary to ectopy, asymptomatic at present:\par     - currently being treated with metoprolol tartrate 25mg 0.5 tab po qhs\par \par \par I spent > 60 minutes of total time on this visit, including time spent face-to-face and non face-to-face.  During this time, I took a relevant history and examined the patient.  I reviewed relevant portions of the medical record and formulated a differential diagnosis and plan.  I explained the relevant cardiac diagnoses to the patient, as well as the work up and management plan.  I answered all questions related to the patient's cardiac conditions. \par

## 2023-06-07 LAB
ALBUMIN SERPL ELPH-MCNC: 4.6 G/DL
ALP BLD-CCNC: 84 U/L
ALT SERPL-CCNC: 20 U/L
ANION GAP SERPL CALC-SCNC: 16 MMOL/L
AST SERPL-CCNC: 25 U/L
BILIRUB SERPL-MCNC: 0.4 MG/DL
BUN SERPL-MCNC: 21 MG/DL
CALCIUM SERPL-MCNC: 10 MG/DL
CHLORIDE SERPL-SCNC: 105 MMOL/L
CHOLEST SERPL-MCNC: 136 MG/DL
CO2 SERPL-SCNC: 22 MMOL/L
CREAT SERPL-MCNC: 0.88 MG/DL
EGFR: 68 ML/MIN/1.73M2
ESTIMATED AVERAGE GLUCOSE: 117 MG/DL
GLUCOSE SERPL-MCNC: 104 MG/DL
HBA1C MFR BLD HPLC: 5.7 %
HDLC SERPL-MCNC: 68 MG/DL
LDLC SERPL DIRECT ASSAY-MCNC: 57 MG/DL
NT-PROBNP SERPL-MCNC: 163 PG/ML
POTASSIUM SERPL-SCNC: 4.7 MMOL/L
PROT SERPL-MCNC: 7.4 G/DL
SODIUM SERPL-SCNC: 143 MMOL/L
TRIGL SERPL-MCNC: 64 MG/DL
TSH SERPL-ACNC: 3.02 UIU/ML

## 2023-09-07 ENCOUNTER — APPOINTMENT (OUTPATIENT)
Dept: PULMONOLOGY | Facility: CLINIC | Age: 78
End: 2023-09-07
Payer: COMMERCIAL

## 2023-09-07 ENCOUNTER — NON-APPOINTMENT (OUTPATIENT)
Age: 78
End: 2023-09-07

## 2023-10-27 PROBLEM — R79.89 D-DIMER, ELEVATED: Status: ACTIVE | Noted: 2022-12-12

## 2023-11-02 ENCOUNTER — APPOINTMENT (OUTPATIENT)
Dept: PULMONOLOGY | Facility: CLINIC | Age: 78
End: 2023-11-02
Payer: COMMERCIAL

## 2023-11-02 VITALS
SYSTOLIC BLOOD PRESSURE: 152 MMHG | HEIGHT: 58.27 IN | HEART RATE: 71 BPM | TEMPERATURE: 98.4 F | OXYGEN SATURATION: 93 % | DIASTOLIC BLOOD PRESSURE: 84 MMHG

## 2023-11-02 DIAGNOSIS — R79.89 OTHER SPECIFIED ABNORMAL FINDINGS OF BLOOD CHEMISTRY: ICD-10-CM

## 2023-11-02 PROCEDURE — 99215 OFFICE O/P EST HI 40 MIN: CPT

## 2023-12-17 PROBLEM — I10 HTN (HYPERTENSION): Status: ACTIVE | Noted: 2023-06-04

## 2023-12-17 PROBLEM — E78.5 DYSLIPIDEMIA: Status: ACTIVE | Noted: 2023-06-04

## 2023-12-17 PROBLEM — Z86.16 HISTORY OF COVID-19: Status: RESOLVED | Noted: 2022-12-08 | Resolved: 2023-12-17

## 2023-12-20 ENCOUNTER — APPOINTMENT (OUTPATIENT)
Dept: HEART AND VASCULAR | Facility: CLINIC | Age: 78
End: 2023-12-20
Payer: COMMERCIAL

## 2023-12-20 VITALS
WEIGHT: 140.04 LBS | BODY MASS INDEX: 29 KG/M2 | HEART RATE: 71 BPM | DIASTOLIC BLOOD PRESSURE: 76 MMHG | HEIGHT: 58.27 IN | SYSTOLIC BLOOD PRESSURE: 116 MMHG | OXYGEN SATURATION: 99 %

## 2023-12-20 DIAGNOSIS — E78.5 HYPERLIPIDEMIA, UNSPECIFIED: ICD-10-CM

## 2023-12-20 DIAGNOSIS — I65.29 OCCLUSION AND STENOSIS OF UNSPECIFIED CAROTID ARTERY: ICD-10-CM

## 2023-12-20 DIAGNOSIS — Z86.16 PERSONAL HISTORY OF COVID-19: ICD-10-CM

## 2023-12-20 DIAGNOSIS — I10 ESSENTIAL (PRIMARY) HYPERTENSION: ICD-10-CM

## 2023-12-20 PROCEDURE — 99214 OFFICE O/P EST MOD 30 MIN: CPT

## 2023-12-20 NOTE — ASSESSMENT
[FreeTextEntry1] : ======================================================================================= 1. Lymphangioleiomyomatosis (MALONE):  - follow up with pulmonologist, Portillo Velasquez MD  - follow up with MALONE specialist, Pablo Ba MD ([facility])  2. Hypercoagulable state: prothrombin gene mutation [id number], s/p left SSV thrombosis (2019):  - continue long-term preventive therapy with Eliquis 2.5mg po bid  - will pursue treatment dose of 5mg po bid around high-risk times (such as flights)  3. Dyslipidemia: LDL 57 (06/07/23):  - continue rosuvastatin 10mg po qd  - discussed therapeutic lifestyle changes to promote improved lipid metabolism  4. HTN: BP at ACC/AHA 2017 guideline target:  - continue candesartan 8mg po qd  5. Palpitations: ? secondary to ectopy, asymptomatic at present:  - currently being treated with metoprolol tartrate 25mg 0.5 tab po qhs  6. Carotid artery disease: mild b/l ICA atherosclerosis:    - will pursue medical management   - will follow with serial carotid artery sonograms (ordered today)   The patient was seen and examined with a cardiology fellow as part of their longitudinal ambulatory cardiology training experience.  Permission was obtained at the time of the visit from the patient for the fellow's participation.

## 2023-12-20 NOTE — HISTORY OF PRESENT ILLNESS
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  She was diagnosed with MALONE in 1996 but her condition is currently stable.  On 05/26/23, she had an echocardiogram which revealed an EF of 57%, mild LVH, grade I diastolic dysfunction, aortic sclerosis, and moderate PI.  She had an episode of nausea, emesis, and abdominal tenderness several weeks prior.  She was evaluated by her PCP who treated her with a PPI and her symptoms have since resolved.

## 2023-12-20 NOTE — REASON FOR VISIT
[Other: ____] : [unfilled] [FreeTextEntry1] : ======================================================================================= Diagnostic Tests: ------------------------------------- ECG: 10/11/23: sinus rhythm, normal ECG 06/06/23: sinus rhythm, normal ECG.  ------------------------------------- Echo: 05/26/23: EF 57%, mild LVH, grade I diastolic dysfunction, aortic sclerosis, moderate PI.  02/16/23: EF 82% aortic sclerosis, mild PI ------------------------------------- PFTs: 02/02/23: normal FEV1/FVC, normal lung capacity, , moderately decreased DLCO.  ------------------------------------- CT: 02/17/23: chest: findings compatible with MALONE, minimal progression, persistent  ------------------------------------- Stress tests: 03/044/2020: ETT: normal post 7 METS.   ------------------------------------- Carotid arteries: 03/03/20: outside lab: sono: JHOANA "36%", LICA "21%".

## 2024-01-10 ENCOUNTER — APPOINTMENT (OUTPATIENT)
Dept: PULMONOLOGY | Facility: CLINIC | Age: 79
End: 2024-01-10
Payer: COMMERCIAL

## 2024-01-10 ENCOUNTER — NON-APPOINTMENT (OUTPATIENT)
Age: 79
End: 2024-01-10

## 2024-01-10 DIAGNOSIS — U07.1 COVID-19: ICD-10-CM

## 2024-01-10 DIAGNOSIS — J31.0 CHRONIC RHINITIS: ICD-10-CM

## 2024-01-10 DIAGNOSIS — I82.812 EMBOLISM AND THROMBOSIS OF SUPERFICIAL VEINS OF LEFT LOWER EXTREMITIES: ICD-10-CM

## 2024-01-10 PROCEDURE — 99204 OFFICE O/P NEW MOD 45 MIN: CPT | Mod: 95

## 2024-01-10 RX ORDER — ALBUTEROL SULFATE 2.5 MG/3ML
(2.5 MG/3ML) SOLUTION RESPIRATORY (INHALATION)
Qty: 1 | Refills: 5 | Status: ACTIVE | COMMUNITY
Start: 2024-01-10 | End: 1900-01-01

## 2024-01-10 RX ORDER — ALBUTEROL SULFATE 90 UG/1
108 (90 BASE) INHALANT RESPIRATORY (INHALATION) EVERY 4 HOURS
Qty: 1 | Refills: 0 | Status: ACTIVE | COMMUNITY
Start: 2024-01-10 | End: 1900-01-01

## 2024-01-11 PROBLEM — J31.0 RHINITIS: Status: ACTIVE | Noted: 2022-12-08

## 2024-01-11 PROBLEM — I82.812 THROMBOSIS OF LEFT SAPHENOUS VEIN: Status: ACTIVE | Noted: 2022-12-08

## 2024-01-11 PROBLEM — U07.1 INFECTION DUE TO 2019 NOVEL CORONAVIRUS: Status: ACTIVE | Noted: 2024-01-11

## 2024-01-11 NOTE — HISTORY OF PRESENT ILLNESS
[Home] : at home, [unfilled] , at the time of the visit. [Medical Office: ([facility])___] : at the medical office located in  [Verbal consent obtained from patient] : the patient, [unfilled] [FreeTextEntry1] : 76 yo F w/ PMH of MALONE, HTN, thrombosis of L saphenous vein on Eliquis, vasculitis presents after testing positive for COVID.    Tested positive for COVID 6 days ago. Saw PMD - recommended tylenol q6h, mucinex. Tmax 99. PT was prescribed Paxlovid in case sx progressed. Pt is on Eliquis and Hydroxychloriquine. Sx improved and so she did not take Paxlovid.    Sx include cough, rhinorrhea. The last several days her symptoms have improved. Got energy back. TAking Flonase bid, which helped. Pulse ox - 97-98%.   COVID still testing positive today  OVernight - woke up out of breath, SpO2 89%. Improved after walking increased to 92%. Now up to 98%. Normal is 93%. Also notes that she has been hypertensive to 150s/90s.   1 year prior had COVID.   On exam via video she is awake, alert, and in no acute distress. She is speaking in full sentences. There is no overt wheezing or frequent cough. She does not appear dyspneic or in any respiratory distress. She is answering questions appropriately.

## 2024-01-11 NOTE — PLAN
[FreeTextEntry1] : Symptoms are overall improving. She had brief episode of hypoxia noted upon awakening. Likely element of hypoventilation while sleeping. Saturation did not drop below 89%. Does not qualify for oxygen therapy. Still having some mucous production.   - take albuterol prn jim before bed to help mobilize secretions - continue flonase - out of the window for paxlovid - does not qualify for O2  - sx overall improving otherwise - continue to monitor    I spent 50 minutes during this encounter. Total time excludes separate billing services.

## 2024-02-15 ENCOUNTER — NON-APPOINTMENT (OUTPATIENT)
Age: 79
End: 2024-02-15

## 2024-02-15 PROBLEM — J84.81 LYMPHANGIOLEIOMYOMATOSIS: Status: ACTIVE | Noted: 2022-12-08

## 2024-02-15 PROBLEM — D68.52 PROTHROMBIN G20210A MUTATION: Status: ACTIVE | Noted: 2022-12-08

## 2024-02-20 ENCOUNTER — OUTPATIENT (OUTPATIENT)
Dept: OUTPATIENT SERVICES | Facility: HOSPITAL | Age: 79
LOS: 1 days | End: 2024-02-20
Payer: COMMERCIAL

## 2024-02-20 ENCOUNTER — RESULT REVIEW (OUTPATIENT)
Age: 79
End: 2024-02-20

## 2024-02-20 ENCOUNTER — APPOINTMENT (OUTPATIENT)
Dept: CT IMAGING | Facility: HOSPITAL | Age: 79
End: 2024-02-20

## 2024-02-20 PROCEDURE — 71250 CT THORAX DX C-: CPT | Mod: 26

## 2024-02-20 PROCEDURE — 71250 CT THORAX DX C-: CPT

## 2024-02-22 ENCOUNTER — APPOINTMENT (OUTPATIENT)
Dept: PULMONOLOGY | Facility: CLINIC | Age: 79
End: 2024-02-22
Payer: COMMERCIAL

## 2024-02-22 VITALS
SYSTOLIC BLOOD PRESSURE: 126 MMHG | BODY MASS INDEX: 28.58 KG/M2 | HEIGHT: 58.27 IN | OXYGEN SATURATION: 96 % | TEMPERATURE: 98.4 F | RESPIRATION RATE: 12 BRPM | WEIGHT: 138 LBS | DIASTOLIC BLOOD PRESSURE: 77 MMHG | HEART RATE: 76 BPM

## 2024-02-22 DIAGNOSIS — D68.52 PROTHROMBIN GENE MUTATION: ICD-10-CM

## 2024-02-22 DIAGNOSIS — J84.81 LYMPHANGIOLEIOMYOMATOSIS: ICD-10-CM

## 2024-02-22 PROCEDURE — 94727 GAS DIL/WSHOT DETER LNG VOL: CPT

## 2024-02-22 PROCEDURE — 94618 PULMONARY STRESS TESTING: CPT

## 2024-02-22 PROCEDURE — 94729 DIFFUSING CAPACITY: CPT

## 2024-02-22 PROCEDURE — 94010 BREATHING CAPACITY TEST: CPT

## 2024-02-22 PROCEDURE — 99212 OFFICE O/P EST SF 10 MIN: CPT | Mod: 25

## 2024-02-22 PROCEDURE — ZZZZZ: CPT

## 2024-02-22 NOTE — CONSULT LETTER
[Dear  ___] : Dear  [unfilled], [Consult Letter:] : I had the pleasure of evaluating your patient, [unfilled]. [FreeTextEntry2] : Dr. Palomo Lewis \par  [address], [address]\par  [place] [address] \par  ph [phone]\par  fax [phone]\par

## 2024-02-22 NOTE — PROCEDURE
[Simple] : simple [FreeTextEntry2] : Small E pleural effusion [FreeTextEntry1] : Echocardiogram 2023\par  Normal LV size, systolic and diastolic function\par  LA is normal in size\par  RV is normal in size and function\par  RA is normal in size\par  PASP is estimated to be 18 mmHg \par  \par  Chest CT 23\par  IMPRESSION:\par  1. Findings compatible with MALONE. There has been minimal interval progression in extent of disease predominately in the upper lung zones with a slight interval increase in the size of multiple thin-walled cysts.\par  2. Persistent small right-sided pleural effusion which has increased minimally in extent.\par  3. Stable 10 mm right breast soft tissue nodule. Stable punctate left breast microcalcification. Clinical and mammographic correlation.\par  \par  Diffusion capacity (DLCO):\par  : 13.58\par  : 12.01\par  2016: 10.5\par  2018: 9.61\par  2018: 10.34\par  : 7.18\par  \par  PFT 23\par  FVC: 2.52 L (115%)\par  FEV1: 1.90 L (117%)\par  FEV1/FVC: 75%\par  FEF 25-75%: 1.50 L/s (113%)\par  T.08 L (99%)\par  RV/T%\par  DLCO: 7.18 (42%)\par  \par  6MWT 23: 316 meters, SpO2 93% -> 93%. \par  \par  Labs \par  normal RNP, pro-BNP, CH50, CMP, Donnelly ab\par  D-dimer > 4000\par  RNP ab elevated (1.2)\par  CRP (10)\par  Positive covid IgG ab, spike ab\par  \par  2022 D-dimer 3.95\par  Negative ds-DNA ab, anticardiolipin IgM, IgG, RNP ab, smith ab, SS-A, Scl-70, Jessica-1\par  Beta 2 glycoprotein IgM (9.6)\par  Normal beta 2 glycoprotein IgG\par  Positive c-ANCA\par  \par  Labs 2021\par  WILLIAN positive\par  Beta-2 Glycoprotein IgA ab\par  Elevated RNP ab (1.5)\par  D-dimer 5.67\par  \par  Lower extremity doppler 2022\par  Impression: Within normal limits bilateral deep and right superficial venous doppler exam findings. At left v. saphena parva from crusis middle plan until distal observed, segment wall has regular thickening has been interpreted as post-thrombotic sequela findings. When compared to exam in , no meaningful changes have been observed.\par  \par  CT 1/10/2019\par  Impression: Disseminated air cysts in pulmonary parenchyma with thin walls, virtually all with subcentimeter size; possible Lymphangioleiomyomatosis; no evidence of secondary supportive finding. Histiosis-x and paraseptal emphysema are considered in differential diagnosis\par  Peripheral atelectasis in right middle lobe and pleuroparenchymal fibrosis\par  mediastinal calcific lymph nodes, a 10 mm nodule in the right breast\par  Cardiovascular calcific atherosclerotic vessel disease\par  \par  Chest CT 2016\par  Impression: Innumerable thin walled cysts diffusely throughout both lung fields with a slight apical predominance on a background of ground glass attenuation. no evidence of nodule or dominant mass. These findings are most consistent with a clinical history of MALONE

## 2024-02-22 NOTE — ASSESSMENT
[FreeTextEntry1] : 2-22-24:  It was a pleasure to see Yancy today in consultation. Her respiratory issues are summarized:  1. Lymphangioleiomyomatosis (MALONE) This patient has a diagnosis of MALONE made at [facility] in 1996. She remains functional and has not noticed any functional limitation. There is no history of pneumothoraces or pleural effusions. 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 She has been taking flights and has not had any problem in flight. As per the history provided, she has not been put on sirolimus.   Most of the people who show progression with MALONE show deterioration in their PFTs and complications at a young stage. Yancy is in her 70s: it is likely that her prognosis will remain good and she will not have deterioration in lung function.   We have ultrasounded Yancy in clinic today due to the fact her CT scan had a small R pleural effusion. On ultrasound in clinic she has a very small R pleural effusion  There has been a decrease in diffusion capacity since 2010. Her D-Dimer is > 4440. She states this is not new and it has been higher.  CT chest 2/17/23 shows no pneumothorax, no pleural effusion. She has continued linear scarring with suture line present from prior surgical lung biopsy. She continues to have diffuse cystic changes throughout all lung fields, worse in the upper lobes bilaterally without significant change from prior scan in 2019.    PFTs show a progressive decline in DLCO. Her prior DLCO in 2010 was 74% predicted, in 2018 was 60% predicted and is now 42% predicted showing a significant recent decline. The echocardiogram done 2/16/23 showed no sign of pulmonary HTN or RV dysfunction to explain the decline in DLCO. The FEV1 has also shown a significant decline from 168% in 2018 to 115%.   We would like to discuss her case with cystic lung disease specialist Dr. Pablo Ba for consultation regarding the initiation of Sirolimus. She does not meet the criteria for starting Sirolimus based on the absolute values of her PFTs or 6MWT (FEV1 of < 70% of predicted, reduction of FEV1 > 100 ml per year, increase in residual volume by greater than 120%, drop in SpO2 > 4% on brisk walking, or a PaO2 < 70mmHg), but she would likely clinically benefit from starting Sirolimus given her recent rapid decline in FEV1 and DLCO to try to maintaining her current functional status.   Yancy was evaluated by MALONE specialist Dr. Pablo Ba for consultation for initiation of Sirolimus. She is clinically doing well and relatively asymptomatic. She has moderate disease involvement based upon extent of CT and reduced DLCO. Shared decision making discussion was had and it was felt that since her DLCO acutally did increase some and she continues to remain asymptomatic with increase in her 6 minute walk distance by 100m with no change in her radiology that we would monitor for the next 6 months. At the 6 month follow up if her DLCO goes down then we will discuss starting sirolimus at a reduced dose of 1mg.  Plan: - Shared decision making and given how stable she is with some improvements will monitor for next 6 months and then consider sirolimus. - Repeat CT scan stable - We will be monitoring for angiolipomas in the kidneys or other organs   2. Hypercoagulable state She has a history of superficial thrombosis of lesser saphenous vein. She has a prothrombin G mutation. She is on Eliquis 2.5 BID. Her d-dimer has been persistently high. Her D-Dimer was > 4440 last visit. - Referred to hematology.  3. History of COVID-19 infection She tested positive for COVID-19 in October. She was sick for 3 weeks. She had a cough and noticed a drop in her oxygen saturation. She is back to her baseline now and does not complain of shortness of breath.  4. 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 She has a rash and history of lipomas. There is ecchymosis seen on her inner thigh. She has been started on Plaquenil by her rheumatologist.   5. Rhinitis She complains of yellow phlegm and change in quality of her voice. She has a deviated nasal septum, erythema and narrowing on the right side, no significant discharge. We recommend Igor med nasal sinus rinse followed by Flonase twice a day. We do not recommend she take antibiotics.  6. 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 She denies symptoms of RICCO. She does not believe that she snores. We will hold off doing a sleep study at this time.  Return to clinic: in 6 months

## 2024-02-22 NOTE — HISTORY OF PRESENT ILLNESS
[TextBox_4] : 78 year old female history of MALONE, superficial venous thrombosis (on eliquis), Prothrombin G therapy, skin vasculitis (diagnosed by biopsy) Born in [facility] [birth year] she was given hormone replacement therapy pre-menopausal In 1996, she developed a cough She was following in [place] because her son was at [place] In 1996 had surgery, flexible bronchoscopy and biopsy on both sides consistent with MALONE Hormone therapy was stopped In 2016, she had a chest CT at [facility] [place], Dr. Marques She was stable until 2012, monitored by chest x-rays and bloodwork, PFTs She had COVID in October that lasted for 3 weeks. She did not take Paxlovid. She had a drop in her oxygen and cough. She had yellow phlegm She has not had a pneumothorax She has had an abnormal d-dimer elevation in the past In September 2019, she had a thrombosis at the lesser saphenous vein beginning from the middle of the calf accompanied by lymphdenma in the distal cruris, urgent hospitalized treated with blood thinners was continued with Xarelto. She had hematomas and bleeding. Xarelto was stopped and she was switched to Eliquis 2.5mg BID, which she continues on She got the flu vaccine, but due to reaction she had to take a medrol arnoldo She denies shortness of breath  Rheumatologist: Dr. Palomo Bobo. He recommends Plaquenil but she is reluctant to try  2-22-24: Patient feeling well with no issues. She is able to exercise and still dealing with her vasculitis for which she takes hydroxychloraquine. She otherwise is feeling well with no complaints. She recently had covid in early january for which she has recovered and only symptoms were nasal congestion.   11-2-23: Was developing vasculitiz. saw dr bobo started hydroxychloraquine and has been on that. x3 weeks ago. Did not do PFTs-had chest pain with associated diarrhea and n/v for 2-3days. Was started on protonix since 10/11. Had ECG at the time which was normal. August 26 had bug bite that turned into vasculitis was given medrol dose pack. Has her own lab work which is all negative. Her urine test was normal.  2-21-23: She reports doing well overall. She has started to work out twice a week with a . She is doing stretching and she is walking on the treadmill. Even after lifting weights and exercising, her O2 sat has been good.  She is able to do her ADLs at home. She can climb stairs slowly.  She has also been started on plaquenil by her rheumatologist.

## 2024-02-22 NOTE — REASON FOR VISIT
[Follow-Up] : a follow-up visit [TextBox_44] : This visit was provided via telehealth using real-time 2-way audio visual technology. The patient, Yancy Garcia, was located at home, [address] [address] , at the time of the visit. \par  The provider, CHRIS SMITH, was located at the medical office located at the time of the visit. The patient, YANCY GARCIA and Provider participated in the telehealth encounter. \par  Verbal consent given on February 21, 2023 by the patient. \par  This visit was provided via telehealth using real-time 2-way audio visual technology. The patient, Yancy Garcia, was located at home, [address] [id number], at the time of the visit. The provider, CHRIS SMITH, was located at the medical office located at the time of the visit. The patient, YANCY GARCIA and Provider participated in the telehealth encounter. Verbal consent given on February 21, 2023 by the patient.

## 2024-02-22 NOTE — DISCUSSION/SUMMARY
[FreeTextEntry1] : ATTENDING'S SUMMARY: 2-22-24: no pallor, no icterus, no cervical adenopathy, no supraclavicular adenopathy.  no cyanosis, no clubbing, hands are slightly cold. no palmar erythema, no raynouds. no nodules palpated on the forearms. Arthritic manifestations with nodules in the DIP. no jvd, no hjr, no clinically detected hsm No pedal edema equal bilaterally. s1s2 no mrg, p2 not loud or split.  good air entry bilaterally, no wheezing, rhonci or crackles. No other adventitious sounds noted.

## 2024-03-01 ENCOUNTER — APPOINTMENT (OUTPATIENT)
Dept: HEART AND VASCULAR | Facility: CLINIC | Age: 79
End: 2024-03-01

## 2024-06-19 ENCOUNTER — APPOINTMENT (OUTPATIENT)
Dept: HEART AND VASCULAR | Facility: CLINIC | Age: 79
End: 2024-06-19

## 2024-08-22 ENCOUNTER — APPOINTMENT (OUTPATIENT)
Dept: PULMONOLOGY | Facility: CLINIC | Age: 79
End: 2024-08-22

## 2024-09-20 ENCOUNTER — NON-APPOINTMENT (OUTPATIENT)
Age: 79
End: 2024-09-20

## 2024-09-25 ENCOUNTER — NON-APPOINTMENT (OUTPATIENT)
Age: 79
End: 2024-09-25

## 2024-09-26 ENCOUNTER — APPOINTMENT (OUTPATIENT)
Dept: PULMONOLOGY | Facility: CLINIC | Age: 79
End: 2024-09-26
Payer: COMMERCIAL

## 2024-09-26 ENCOUNTER — LABORATORY RESULT (OUTPATIENT)
Age: 79
End: 2024-09-26

## 2024-09-26 VITALS
HEART RATE: 68 BPM | OXYGEN SATURATION: 96 % | TEMPERATURE: 98.1 F | WEIGHT: 137 LBS | HEIGHT: 58.27 IN | SYSTOLIC BLOOD PRESSURE: 138 MMHG | BODY MASS INDEX: 28.37 KG/M2 | RESPIRATION RATE: 12 BRPM | DIASTOLIC BLOOD PRESSURE: 66 MMHG

## 2024-09-26 DIAGNOSIS — D68.52 PROTHROMBIN GENE MUTATION: ICD-10-CM

## 2024-09-26 DIAGNOSIS — J84.81 LYMPHANGIOLEIOMYOMATOSIS: ICD-10-CM

## 2024-09-26 PROCEDURE — G2211 COMPLEX E/M VISIT ADD ON: CPT | Mod: NC

## 2024-09-26 PROCEDURE — 99215 OFFICE O/P EST HI 40 MIN: CPT

## 2024-09-27 LAB
ALBUMIN SERPL ELPH-MCNC: 4.3 G/DL
ALP BLD-CCNC: 78 U/L
ALT SERPL-CCNC: 19 U/L
ANA SER QL IA: POSITIVE
ANION GAP SERPL CALC-SCNC: 13 MMOL/L
AST SERPL-CCNC: 25 U/L
BILIRUB SERPL-MCNC: 0.2 MG/DL
BUN SERPL-MCNC: 25 MG/DL
C3 SERPL-MCNC: 128 MG/DL
C4 SERPL-MCNC: 33 MG/DL
CALCIUM SERPL-MCNC: 9.3 MG/DL
CENTROMERE IGG SER-ACNC: <0.2 AL
CHLORIDE SERPL-SCNC: 104 MMOL/L
CHROMATIN AB SERPL-ACNC: <0.2 AL
CO2 SERPL-SCNC: 24 MMOL/L
CREAT SERPL-MCNC: 0.8 MG/DL
CRP SERPL-MCNC: <3 MG/L
DSDNA AB SER-ACNC: <1 IU/ML
EGFR: 75 ML/MIN/1.73M2
ENA JO1 AB SER IA-ACNC: <0.2 AL
ENA RNP AB SER IA-ACNC: 3.3 AL
ENA SCL70 IGG SER IA-ACNC: <0.2 AL
ENA SM AB SER IA-ACNC: <0.2 AL
ENA SS-A AB SER IA-ACNC: <0.2 AL
ENA SS-B AB SER IA-ACNC: <0.2 AL
ERYTHROCYTE [SEDIMENTATION RATE] IN BLOOD BY WESTERGREN METHOD: 23 MM/HR
GLUCOSE SERPL-MCNC: 93 MG/DL
POTASSIUM SERPL-SCNC: 3.8 MMOL/L
PROT SERPL-MCNC: 6.8 G/DL
RHEUMATOID FACT SER QL: 11 IU/ML
RIBOSOMAL P AB SER IA-ACNC: <0.2 AL
SODIUM SERPL-SCNC: 141 MMOL/L

## 2024-09-27 NOTE — DISCUSSION/SUMMARY
[FreeTextEntry1] : ATTENDING'S SUMMARY: 9-26-24: no pallor, no icterus, no cervical adenopathy, no supraclavicular adenopathy. no jvd, no hjr, no clinically detected hsm superficial veins are prominent in the lower extremity with eczema noted in the lower one third of the legs; rash appears to be maculopapular, slightly raised, erythematous,  non scaly almost of a purpuric nature (biopsied and found to be an id reaction 2/2 atopic dermatitis)  no tenderness of the calf muscle, no cords  s1s2 no mrg, p2 not loud or split.  good air entry bilaterally, no wheezing, rhonci or crackles. No other adventitious sounds noted.

## 2024-09-27 NOTE — ASSESSMENT
[FreeTextEntry1] : 9-26-24:  It was a pleasure to see Yancy today in consultation. 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 She remains functional and has not noticed any functional limitation. There is no history of pneumothoraces or pleural effusions. 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 She has been taking flights and has not had any problem in flight. As per the history provided, she has not been put on sirolimus.   Most of the people who show progression with MALONE show deterioration in their PFTs and complications at a young stage. Yancy is in her 70s: it is likely that her prognosis will remain good and she will not have deterioration in lung function.   There has been a decrease in diffusion capacity since 2010. Her D-Dimer is > 4440. She states this is not new and it has been higher.  CT chest 2/17/23 shows no pneumothorax, no pleural effusion. She has continued linear scarring with suture line present from prior surgical lung biopsy. She continues to have diffuse cystic changes throughout all lung fields, worse in the upper lobes bilaterally without significant change from prior scan in 2019.    PFTs show a progressive decline in DLCO. Her prior DLCO in 2010 was 74% predicted, in 2018 was 60% predicted and is now 42% predicted showing a significant recent decline. The echocardiogram done 2/16/23 showed no sign of pulmonary HTN or RV dysfunction to explain the decline in DLCO. The FEV1 has also shown a significant decline from 168% in 2018 to 115%.   We would like to discuss her case with cystic lung disease specialist Dr. Pablo Ba for consultation regarding the initiation of Sirolimus. She does not meet the criteria for starting Sirolimus based on the absolute values of her PFTs or 6MWT (FEV1 of < 70% of predicted, reduction of FEV1 > 100 ml per year, increase in residual volume by greater than 120%, drop in SpO2 > 4% on brisk walking, or a PaO2 < 70mmHg), but she would likely clinically benefit from starting Sirolimus given her recent rapid decline in FEV1 and DLCO to try to maintaining her current functional status.   Yancy was evaluated by MALONE specialist Dr. Pablo Ba for consultation for initiation of Sirolimus. She is clinically doing well and relatively asymptomatic. She has moderate disease involvement based upon extent of CT and reduced DLCO. Shared decision making discussion was had and it was felt that since her DLCO acutally did increase some and she continues to remain asymptomatic with increase in her 6 minute walk distance by 100m with no change in her radiology that we would monitor for the next 6 months. At the 6 month follow up if her DLCO goes down then we will discuss starting sirolimus at a reduced dose of 1mg.  Plan: - Shared decision making and given how stable she is with some improvements will monitor for next 6 months and then consider sirolimus; discussed if there is significant change in her FEV1, her DLCO, or decline in her functional status we would consider sirolimus - Repeat CT scan stable - We will be monitoring for angiolipomas in the kidneys or other organs   2. Hypercoagulable state She has a history of superficial thrombosis of lesser saphenous vein. She has a prothrombin G mutation. She is on Eliquis 2.5 BID. Her d-dimer has been persistently high. Her D-Dimer was > 4440 last visit. - Referred to hematology.  3. History of COVID-19 infection She tested positive for COVID-19 in October. She was sick for 3 weeks. She had a cough and noticed a drop in her oxygen saturation. She is back to her baseline now and does not complain of shortness of breath.  4. 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 She has a rash and history of lipomas. There is ecchymosis seen on her inner thigh. She has been started on Plaquenil by her rheumatologist. She had skin biopsies which revealed an id reaction and no active vasculitis.   5. Rhinitis She complains of yellow phlegm and change in quality of her voice. She has a deviated nasal septum, erythema and narrowing on the right side, no significant discharge. We recommend Igor med nasal sinus rinse followed by Flonase twice a day. We do not recommend she take antibiotics.  6. 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 She denies symptoms of RICCO. She does not believe that she snores. We will hold off doing a sleep study at this time.  Return to clinic: in 6 months (repeat PFTs/6MWT)

## 2024-09-27 NOTE — REASON FOR VISIT
[Follow-Up] : a follow-up visit [TextBox_44] : MALONE The provider, CHRIS SMITH, was located at the medical office located at the time of the visit. The patient, FIOR GARCIA and Provider participated in the telehealth encounter.  Verbal consent given on February 21, 2023 by the patient.  This visit was provided via telehealth using real-time 2-way audio visual technology. The patient, Fior Garcia, was located at [facility], [address] [place] [address], at the time of the visit. The provider, CHRIS SMITH, was located at the medical office located at the time of the visit. The patient, FIOR GARCIA and Provider participated in the telehealth encounter. Verbal consent given on February 21, 2023 by the patient.

## 2024-09-27 NOTE — ADDENDUM
[FreeTextEntry1] : I, Dr. Portillo Velasquez, personally discussed the patient with the fellow at the time of the visit. I agree with the assessment and plan as written unless noted below.  I was present with the fellow for the key portions of the history and exam.  I spent  a total of   40 minutes evaluating the patient today. This includes spending 10 min on reviewing the patient's serial CT scans and PFT's before the patients visit.  The remaining time was spent with the patient in discussing PE, MALONE, calling up MALONE clinic she was referred, reason for not getting PFTs, eliquis, skin biopsy  This time does not include performance of any procedures such as PFT

## 2024-09-27 NOTE — REASON FOR VISIT
[Follow-Up] : a follow-up visit [TextBox_44] : MALONE The provider, CHRIS SMITH, was located at the medical office located at the time of the visit. The patient, FIOR GARCIA and Provider participated in the telehealth encounter.  Verbal consent given on February 21, 2023 by the patient.  This visit was provided via telehealth using real-time 2-way audio visual technology. The patient, Fior Garcia, was located at [facility], [address] [place] [address], at the time of the visit. The provider, CHRIS SMITH, was located at the medical office located at the time of the visit. The patient, FIOR GARCIA and Provider participated in the telehealth encounter. Verbal consent given on February 21, 2023 by the patient.

## 2024-09-27 NOTE — PROCEDURE
[Simple] : simple [Pleural Effusion] : Pleural Effusion: No [FreeTextEntry2] : Small R pleural effusion [FreeTextEntry1] : PFTs 2024   FVC: 2.37 L (113%)  FEV1: 1.75 L (112%)  FEV1/FVC: 74%  FEF 25-75%: 1.22 L/s (99%)  TLC: 3.77 L (92%)  RV/T.79%  DLCO: 8.07 (47%)    6MWT 2024: 420 meters, SpO2 99% -> 94%.   CT Chest 2024  IMPRESSION: 1. Since 2023, slightly increased small right pleural effusion. 2. Stable findings of lymphangioleiomyomatosis.   Echocardiogram 2023 Normal LV size, systolic and diastolic function LA is normal in size RV is normal in size and function RA is normal in size PASP is estimated to be 18 mmHg   Chest CT 23 IMPRESSION: 1. Findings compatible with MALONE. There has been minimal interval progression in extent of disease predominately in the upper lung zones with a slight interval increase in the size of multiple thin-walled cysts. 2. Persistent small right-sided pleural effusion which has increased minimally in extent. 3. Stable 10 mm right breast soft tissue nodule. Stable punctate left breast microcalcification. Clinical and mammographic correlation.  Diffusion capacity (DLCO): : 13.58 : 12.01 2016: 10.2018: 9.: 10.34 : 7.18  PFT 23 FVC: 2.52 L (115%) FEV1: 1.90 L (117%) FEV1/FVC: 75% FEF 25-75%: 1.50 L/s (113%) T.08 L (99%) RV/T% DLCO: 7.18 (42%)  6MWT 23: 316 meters, SpO2 93% -> 93%.   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 At left v. saphena parva from crusis middle plan until distal observed, segment wall has regular thickening has been interpreted as post-thrombotic sequela findings. When compared to exam in , no meaningful changes have been observed.  CT 1/10/2019 Impression: Disseminated air cysts in pulmonary parenchyma with thin walls, virtually all with subcentimeter size; possible Lymphangioleiomyomatosis; no evidence of secondary supportive finding. Histiosis-x and paraseptal emphysema are considered in differential diagnosis Peripheral atelectasis in right middle lobe and pleuroparenchymal fibrosis mediastinal calcific lymph nodes, a 10 mm nodule in the right breast Cardiovascular calcific atherosclerotic vessel disease  Chest CT 2016 Impression: Innumerable thin walled cysts diffusely throughout both lung fields with a slight apical predominance on a background of ground glass attenuation. no evidence of nodule or dominant mass. These findings are most consistent with a clinical history of MALONE

## 2024-09-27 NOTE — HISTORY OF PRESENT ILLNESS
[TextBox_4] : 78 year old female history of MALONE, superficial venous thrombosis (on eliquis), Prothrombin G therapy, skin vasculitis (diagnosed by biopsy) Born in [facility] [birth year] she was given hormone replacement therapy pre-menopausal In 1996, she developed a cough She was following in [place] because her son was at [place] In 1996 had surgery, flexible bronchoscopy and biopsy on both sides consistent with MALONE Hormone therapy was stopped In 2016, she had a chest CT at [facility], Dr. Marques She was stable until 2012, monitored by chest x-rays and bloodwork, PFTs She had COVID in October that lasted for 3 weeks. She did not take Paxlovid. She had a drop in her oxygen and cough. She had yellow phlegm She has not had a pneumothorax She has had an abnormal d-dimer elevation in the past In September 2019, she had a thrombosis at the lesser saphenous vein beginning from the middle of the calf accompanied by lymphdenma in the distal cruris, urgent hospitalized treated with blood thinners was continued with Xarelto. She had hematomas and bleeding. Xarelto was stopped and she was switched to Eliquis 2.5mg BID, which she continues on She got the flu vaccine, but due to reaction she had to take a medrol arnoldo She denies shortness of breath Rheumatologist: Dr. Palomo Bobo. He recommends Plaquenil but she is reluctant to try  9-26-24: Here for follow up. Had several long flights without difficulties; remains on eliquis. From lung perspective she has no complaints. Had skin biopsy to see if her vasculitis was flaring up; pathology report stating dermatitis. Did not do PFTs/6MWT because of biopsies and wants to be at 100%. Remains on plaquenil for vasculitis.   2-22-24: Patient feeling well with no issues. She is able to exercise and still dealing with her vasculitis for which she takes hydroxychloraquine. She otherwise is feeling well with no complaints. She recently had covid in early january for which she has recovered and only symptoms were nasal congestion.   11-2-23: Was developing vasculitiz. saw dr bobo started hydroxychloraquine and has been on that. x3 weeks ago. Did not do PFTs-had chest pain with associated diarrhea and n/v for 2-3days. Was started on protonix since 10/11. Had ECG at the time which was normal. August 26 had bug bite that turned into vasculitis was given medrol dose pack. Has her own lab work which is all negative. Her urine test was normal.  2-21-23: She reports doing well overall. She has started to work out twice a week with a . She is doing stretching and she is walking on the treadmill. Even after lifting weights and exercising, her O2 sat has been good.  She is able to do her ADLs at home. She can climb stairs slowly.  She has also been started on plaquenil by her rheumatologist.

## 2024-09-27 NOTE — ADDENDUM
[FreeTextEntry1] : I, Dr. Portillo Velasquez, personally discussed the patient with the fellow at the time of the visit. I agree with the assessment and plan as written unless noted below.  I was present with the fellow for the key portions of the history and exam.  I spent  a total of   40 minutes evaluating the patient today. This includes spending 10 min on reviewing the patient's serial CT scans and PFT's before the patients visit.  The remaining time was spent with the patient in discussing PE, MALONE, calling up MALONE clinic she was referred, reason for not getting PFTs, eliquis, skin biopsy  This time does not include performance of any procedures such as PFT 53

## 2024-09-27 NOTE — ASSESSMENT
[FreeTextEntry1] : 9-26-24:  It was a pleasure to see Yancy today in consultation. 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 She remains functional and has not noticed any functional limitation. There is no history of pneumothoraces or pleural effusions. 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 She has been taking flights and has not had any problem in flight. As per the history provided, she has not been put on sirolimus.   Most of the people who show progression with MALONE show deterioration in their PFTs and complications at a young stage. Yancy is in her 70s: it is likely that her prognosis will remain good and she will not have deterioration in lung function.   There has been a decrease in diffusion capacity since 2010. Her D-Dimer is > 4440. She states this is not new and it has been higher.  CT chest 2/17/23 shows no pneumothorax, no pleural effusion. She has continued linear scarring with suture line present from prior surgical lung biopsy. She continues to have diffuse cystic changes throughout all lung fields, worse in the upper lobes bilaterally without significant change from prior scan in 2019.    PFTs show a progressive decline in DLCO. Her prior DLCO in 2010 was 74% predicted, in 2018 was 60% predicted and is now 42% predicted showing a significant recent decline. The echocardiogram done 2/16/23 showed no sign of pulmonary HTN or RV dysfunction to explain the decline in DLCO. The FEV1 has also shown a significant decline from 168% in 2018 to 115%.   We would like to discuss her case with cystic lung disease specialist Dr. Pablo Ba for consultation regarding the initiation of Sirolimus. She does not meet the criteria for starting Sirolimus based on the absolute values of her PFTs or 6MWT (FEV1 of < 70% of predicted, reduction of FEV1 > 100 ml per year, increase in residual volume by greater than 120%, drop in SpO2 > 4% on brisk walking, or a PaO2 < 70mmHg), but she would likely clinically benefit from starting Sirolimus given her recent rapid decline in FEV1 and DLCO to try to maintaining her current functional status.   Yancy was evaluated by MALONE specialist Dr. Pablo Ba for consultation for initiation of Sirolimus. She is clinically doing well and relatively asymptomatic. She has moderate disease involvement based upon extent of CT and reduced DLCO. Shared decision making discussion was had and it was felt that since her DLCO acutally did increase some and she continues to remain asymptomatic with increase in her 6 minute walk distance by 100m with no change in her radiology that we would monitor for the next 6 months. At the 6 month follow up if her DLCO goes down then we will discuss starting sirolimus at a reduced dose of 1mg.  Plan: - Shared decision making and given how stable she is with some improvements will monitor for next 6 months and then consider sirolimus; discussed if there is significant change in her FEV1, her DLCO, or decline in her functional status we would consider sirolimus - Repeat CT scan stable - We will be monitoring for angiolipomas in the kidneys or other organs   2. Hypercoagulable state She has a history of superficial thrombosis of lesser saphenous vein. She has a prothrombin G mutation. She is on Eliquis 2.5 BID. Her d-dimer has been persistently high. Her D-Dimer was > 4440 last visit. - Referred to hematology.  3. History of COVID-19 infection She tested positive for COVID-19 in October. She was sick for 3 weeks. She had a cough and noticed a drop in her oxygen saturation. She is back to her baseline now and does not complain of shortness of breath.  4. 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 She has a rash and history of lipomas. There is ecchymosis seen on her inner thigh. She has been started on Plaquenil by her rheumatologist. She had skin biopsies which revealed an id reaction and no active vasculitis.   5. Rhinitis She complains of yellow phlegm and change in quality of her voice. She has a deviated nasal septum, erythema and narrowing on the right side, no significant discharge. We recommend Igor med nasal sinus rinse followed by Flonase twice a day. We do not recommend she take antibiotics.  6. 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 She denies symptoms of RICCO. She does not believe that she snores. We will hold off doing a sleep study at this time.  Return to clinic: in 6 months (repeat PFTs/6MWT)

## 2024-09-27 NOTE — ASSESSMENT
[FreeTextEntry1] : 9-26-24:  It was a pleasure to see Yancy today in consultation. 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 She remains functional and has not noticed any functional limitation. There is no history of pneumothoraces or pleural effusions. 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 She has been taking flights and has not had any problem in flight. As per the history provided, she has not been put on sirolimus.   Most of the people who show progression with MALONE show deterioration in their PFTs and complications at a young stage. Yancy is in her 70s: it is likely that her prognosis will remain good and she will not have deterioration in lung function.   There has been a decrease in diffusion capacity since 2010. Her D-Dimer is > 4440. She states this is not new and it has been higher.  CT chest 2/17/23 shows no pneumothorax, no pleural effusion. She has continued linear scarring with suture line present from prior surgical lung biopsy. She continues to have diffuse cystic changes throughout all lung fields, worse in the upper lobes bilaterally without significant change from prior scan in 2019.    PFTs show a progressive decline in DLCO. Her prior DLCO in 2010 was 74% predicted, in 2018 was 60% predicted and is now 42% predicted showing a significant recent decline. The echocardiogram done 2/16/23 showed no sign of pulmonary HTN or RV dysfunction to explain the decline in DLCO. The FEV1 has also shown a significant decline from 168% in 2018 to 115%.   We would like to discuss her case with cystic lung disease specialist Dr. Pablo Ba for consultation regarding the initiation of Sirolimus. She does not meet the criteria for starting Sirolimus based on the absolute values of her PFTs or 6MWT (FEV1 of < 70% of predicted, reduction of FEV1 > 100 ml per year, increase in residual volume by greater than 120%, drop in SpO2 > 4% on brisk walking, or a PaO2 < 70mmHg), but she would likely clinically benefit from starting Sirolimus given her recent rapid decline in FEV1 and DLCO to try to maintaining her current functional status.   Yancy was evaluated by MALONE specialist Dr. Pablo Ba for consultation for initiation of Sirolimus. She is clinically doing well and relatively asymptomatic. She has moderate disease involvement based upon extent of CT and reduced DLCO. Shared decision making discussion was had and it was felt that since her DLCO acutally did increase some and she continues to remain asymptomatic with increase in her 6 minute walk distance by 100m with no change in her radiology that we would monitor for the next 6 months. At the 6 month follow up if her DLCO goes down then we will discuss starting sirolimus at a reduced dose of 1mg.  Plan: - Shared decision making and given how stable she is with some improvements will monitor for next 6 months and then consider sirolimus; discussed if there is significant change in her FEV1, her DLCO, or decline in her functional status we would consider sirolimus - Repeat CT scan stable - We will be monitoring for angiolipomas in the kidneys or other organs   2. Hypercoagulable state She has a history of superficial thrombosis of lesser saphenous vein. She has a prothrombin G mutation. She is on Eliquis 2.5 BID. Her d-dimer has been persistently high. Her D-Dimer was > 4440 last visit. - Referred to hematology.  3. History of COVID-19 infection She tested positive for COVID-19 in October. She was sick for 3 weeks. She had a cough and noticed a drop in her oxygen saturation. She is back to her baseline now and does not complain of shortness of breath.  4. 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 She has a rash and history of lipomas. There is ecchymosis seen on her inner thigh. She has been started on Plaquenil by her rheumatologist. She had skin biopsies which revealed an id reaction and no active vasculitis.   5. Rhinitis She complains of yellow phlegm and change in quality of her voice. She has a deviated nasal septum, erythema and narrowing on the right side, no significant discharge. We recommend Igor med nasal sinus rinse followed by Flonase twice a day. We do not recommend she take antibiotics.  6. 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 She denies symptoms of RICCO. She does not believe that she snores. We will hold off doing a sleep study at this time.  Return to clinic: in 6 months (repeat PFTs/6MWT)

## 2024-09-27 NOTE — HISTORY OF PRESENT ILLNESS
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 Marques She was stable until 2012, monitored by chest x-rays and bloodwork, PFTs She had COVID in October that lasted for 3 weeks. She did not take Paxlovid. She had a drop in her oxygen and cough. She had yellow phlegm She has not had a pneumothorax She has had an abnormal d-dimer elevation in the past In September 2019, she had a thrombosis at the lesser saphenous vein beginning from the middle of the calf accompanied by lymphdenma in the distal cruris, urgent hospitalized treated with blood thinners was continued with Xarelto. She had hematomas and bleeding. Xarelto was stopped and she was switched to Eliquis 2.5mg BID, which she continues on She got the flu vaccine, but due to reaction she had to take a medrol arnoldo She denies shortness of breath Rheumatologist: Dr. Palomo Bobo. He recommends Plaquenil but she is reluctant to try  9-26-24: Here for follow up. Had several long flights without difficulties; remains on eliquis. From lung perspective she has no complaints. Had skin biopsy to see if her vasculitis was flaring up; pathology report stating dermatitis. Did not do PFTs/6MWT because of biopsies and wants to be at 100%. Remains on plaquenil for vasculitis.   2-22-24: Patient feeling well with no issues. She is able to exercise and still dealing with her vasculitis for which she takes hydroxychloraquine. She otherwise is feeling well with no complaints. She recently had covid in early january for which she has recovered and only symptoms were nasal congestion.   11-2-23: Was developing vasculitiz. saw dr bobo started hydroxychloraquine and has been on that. x3 weeks ago. Did not do PFTs-had chest pain with associated diarrhea and n/v for 2-3days. Was started on protonix since 10/11. Had ECG at the time which was normal. August 26 had bug bite that turned into vasculitis was given medrol dose pack. Has her own lab work which is all negative. Her urine test was normal.  2-21-23: She reports doing well overall. She has started to work out twice a week with a . She is doing stretching and she is walking on the treadmill. Even after lifting weights and exercising, her O2 sat has been good.  She is able to do her ADLs at home. She can climb stairs slowly.  She has also been started on plaquenil by her rheumatologist.

## 2024-09-27 NOTE — REASON FOR VISIT
[Follow-Up] : a follow-up visit [TextBox_44] : MALONE The provider, CHRIS SMITH, was located at the medical office located at the time of the visit. The patient, FIOR GARCIA and Provider participated in the telehealth encounter.  Verbal consent given on February 21, 2023 by the patient.  This visit was provided via telehealth using real-time 2-way audio visual technology. The patient, Fior Garcia, was located at [place], [address] [place] [address], at the time of the visit. The provider, CHRIS SMITH, was located at the medical office located at the time of the visit. The patient, FIOR GARCIA and Provider participated in the telehealth encounter. Verbal consent given on February 21, 2023 by the patient.

## 2024-09-27 NOTE — HISTORY OF PRESENT ILLNESS
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 Marques She was stable until 2012, monitored by chest x-rays and bloodwork, PFTs She had COVID in October that lasted for 3 weeks. She did not take Paxlovid. She had a drop in her oxygen and cough. She had yellow phlegm She has not had a pneumothorax She has had an abnormal d-dimer elevation in the past In September 2019, she had a thrombosis at the lesser saphenous vein beginning from the middle of the calf accompanied by lymphdenma in the distal cruris, urgent hospitalized treated with blood thinners was continued with Xarelto. She had hematomas and bleeding. Xarelto was stopped and she was switched to Eliquis 2.5mg BID, which she continues on She got the flu vaccine, but due to reaction she had to take a medrol arnoldo She denies shortness of breath Rheumatologist: Dr. Palomo Bobo. He recommends Plaquenil but she is reluctant to try  9-26-24: Here for follow up. Had several long flights without difficulties; remains on eliquis. From lung perspective she has no complaints. Had skin biopsy to see if her vasculitis was flaring up; pathology report stating dermatitis. Did not do PFTs/6MWT because of biopsies and wants to be at 100%. Remains on plaquenil for vasculitis.   2-22-24: Patient feeling well with no issues. She is able to exercise and still dealing with her vasculitis for which she takes hydroxychloraquine. She otherwise is feeling well with no complaints. She recently had covid in early january for which she has recovered and only symptoms were nasal congestion.   11-2-23: Was developing vasculitiz. saw dr bobo started hydroxychloraquine and has been on that. x3 weeks ago. Did not do PFTs-had chest pain with associated diarrhea and n/v for 2-3days. Was started on protonix since 10/11. Had ECG at the time which was normal. August 26 had bug bite that turned into vasculitis was given medrol dose pack. Has her own lab work which is all negative. Her urine test was normal.  2-21-23: She reports doing well overall. She has started to work out twice a week with a . She is doing stretching and she is walking on the treadmill. Even after lifting weights and exercising, her O2 sat has been good.  She is able to do her ADLs at home. She can climb stairs slowly.  She has also been started on plaquenil by her rheumatologist.

## 2025-03-04 ENCOUNTER — APPOINTMENT (OUTPATIENT)
Dept: PULMONOLOGY | Facility: CLINIC | Age: 80
End: 2025-03-04

## 2025-03-04 DIAGNOSIS — J84.81 LYMPHANGIOLEIOMYOMATOSIS: ICD-10-CM

## 2025-03-04 DIAGNOSIS — J31.0 CHRONIC RHINITIS: ICD-10-CM

## 2025-03-04 DIAGNOSIS — D68.52 PROTHROMBIN GENE MUTATION: ICD-10-CM

## 2025-04-21 ENCOUNTER — NON-APPOINTMENT (OUTPATIENT)
Age: 80
End: 2025-04-21

## 2025-04-23 ENCOUNTER — APPOINTMENT (OUTPATIENT)
Dept: PULMONOLOGY | Facility: CLINIC | Age: 80
End: 2025-04-23

## 2025-04-23 PROCEDURE — 94060 EVALUATION OF WHEEZING: CPT

## 2025-04-23 PROCEDURE — 94727 GAS DIL/WSHOT DETER LNG VOL: CPT

## 2025-04-23 PROCEDURE — 94729 DIFFUSING CAPACITY: CPT

## 2025-04-25 ENCOUNTER — NON-APPOINTMENT (OUTPATIENT)
Age: 80
End: 2025-04-25

## 2025-04-29 ENCOUNTER — NON-APPOINTMENT (OUTPATIENT)
Age: 80
End: 2025-04-29

## 2025-04-29 ENCOUNTER — APPOINTMENT (OUTPATIENT)
Dept: PULMONOLOGY | Facility: CLINIC | Age: 80
End: 2025-04-29
Payer: COMMERCIAL

## 2025-04-29 VITALS
HEIGHT: 58 IN | WEIGHT: 143 LBS | RESPIRATION RATE: 13 BRPM | TEMPERATURE: 98.4 F | BODY MASS INDEX: 30.02 KG/M2 | HEART RATE: 85 BPM | DIASTOLIC BLOOD PRESSURE: 79 MMHG | SYSTOLIC BLOOD PRESSURE: 130 MMHG | OXYGEN SATURATION: 96 %

## 2025-04-29 DIAGNOSIS — J31.0 CHRONIC RHINITIS: ICD-10-CM

## 2025-04-29 DIAGNOSIS — R79.89 OTHER SPECIFIED ABNORMAL FINDINGS OF BLOOD CHEMISTRY: ICD-10-CM

## 2025-04-29 DIAGNOSIS — D68.52 PROTHROMBIN GENE MUTATION: ICD-10-CM

## 2025-04-29 DIAGNOSIS — J84.81 LYMPHANGIOLEIOMYOMATOSIS: ICD-10-CM

## 2025-04-29 PROCEDURE — 99215 OFFICE O/P EST HI 40 MIN: CPT

## 2025-04-29 PROCEDURE — G2211 COMPLEX E/M VISIT ADD ON: CPT | Mod: NC

## 2025-06-11 ENCOUNTER — APPOINTMENT (OUTPATIENT)
Dept: HEART AND VASCULAR | Facility: CLINIC | Age: 80
End: 2025-06-11
Payer: COMMERCIAL

## 2025-06-11 VITALS
DIASTOLIC BLOOD PRESSURE: 79 MMHG | SYSTOLIC BLOOD PRESSURE: 132 MMHG | HEIGHT: 58 IN | WEIGHT: 140 LBS | OXYGEN SATURATION: 95 % | BODY MASS INDEX: 29.39 KG/M2 | HEART RATE: 86 BPM

## 2025-06-11 PROCEDURE — G2211 COMPLEX E/M VISIT ADD ON: CPT | Mod: NC

## 2025-06-11 PROCEDURE — 93000 ELECTROCARDIOGRAM COMPLETE: CPT

## 2025-06-11 PROCEDURE — 99215 OFFICE O/P EST HI 40 MIN: CPT

## 2025-06-16 ENCOUNTER — APPOINTMENT (OUTPATIENT)
Dept: HEART AND VASCULAR | Facility: CLINIC | Age: 80
End: 2025-06-16